# Patient Record
Sex: FEMALE | Race: WHITE | URBAN - METROPOLITAN AREA
[De-identification: names, ages, dates, MRNs, and addresses within clinical notes are randomized per-mention and may not be internally consistent; named-entity substitution may affect disease eponyms.]

---

## 2017-01-05 ENCOUNTER — OFFICE VISIT (OUTPATIENT)
Dept: FAMILY MEDICINE | Facility: CLINIC | Age: 27
End: 2017-01-05
Payer: COMMERCIAL

## 2017-01-05 VITALS
SYSTOLIC BLOOD PRESSURE: 115 MMHG | BODY MASS INDEX: 22.36 KG/M2 | WEIGHT: 131 LBS | TEMPERATURE: 98.6 F | DIASTOLIC BLOOD PRESSURE: 69 MMHG | HEIGHT: 64 IN | HEART RATE: 77 BPM

## 2017-01-05 DIAGNOSIS — K62.5 RECTAL BLEEDING: ICD-10-CM

## 2017-01-05 DIAGNOSIS — Z00.00 ANNUAL VISIT FOR GENERAL ADULT MEDICAL EXAMINATION WITHOUT ABNORMAL FINDINGS: Primary | ICD-10-CM

## 2017-01-05 PROBLEM — S83.511S RUPTURE OF ANTERIOR CRUCIATE LIGAMENT OF RIGHT KNEE, SEQUELA: Status: ACTIVE | Noted: 2017-01-05

## 2017-01-05 LAB
ERYTHROCYTE [DISTWIDTH] IN BLOOD BY AUTOMATED COUNT: 12.2 % (ref 10–15)
HCT VFR BLD AUTO: 40.4 % (ref 35–47)
HGB BLD-MCNC: 13.6 G/DL (ref 11.7–15.7)
MCH RBC QN AUTO: 29.3 PG (ref 26.5–33)
MCHC RBC AUTO-ENTMCNC: 33.7 G/DL (ref 31.5–36.5)
MCV RBC AUTO: 87 FL (ref 78–100)
PLATELET # BLD AUTO: 257 10E9/L (ref 150–450)
RBC # BLD AUTO: 4.64 10E12/L (ref 3.8–5.2)
WBC # BLD AUTO: 8.4 10E9/L (ref 4–11)

## 2017-01-05 PROCEDURE — 99385 PREV VISIT NEW AGE 18-39: CPT | Performed by: INTERNAL MEDICINE

## 2017-01-05 PROCEDURE — 85027 COMPLETE CBC AUTOMATED: CPT | Performed by: INTERNAL MEDICINE

## 2017-01-05 PROCEDURE — 36415 COLL VENOUS BLD VENIPUNCTURE: CPT | Performed by: INTERNAL MEDICINE

## 2017-01-05 PROCEDURE — 99212 OFFICE O/P EST SF 10 MIN: CPT | Mod: 25 | Performed by: INTERNAL MEDICINE

## 2017-01-05 RX ORDER — DESOGESTREL AND ETHINYL ESTRADIOL 21-5 (28)
1 KIT ORAL DAILY
COMMUNITY

## 2017-01-05 NOTE — Clinical Note
Please abstract the following data from this visit with this patient into the appropriate field in Epic:  Pap smear done on this date: 8/2016 (approximately), by this group: planned parent kristyn winters  results were normal

## 2017-01-05 NOTE — MR AVS SNAPSHOT
After Visit Summary   1/5/2017    Sarah Prater    MRN: [id number]           Patient Information     Date Of Birth          1990        Visit Information        Provider Department      1/5/2017 10:00 AM Gaviota Eli MD [place]        Today's Diagnoses     Annual visit for general adult medical examination without abnormal findings    -  1     Rectal bleeding           Care Instructions      Preventive Health Recommendations  Female Ages 18 to 25     Yearly exam:     See your health care provider every year in order to  o Review health changes.   o Discuss preventive care.    o Review your medicines if your doctor has prescribed any.      You should be tested each year for STDs (sexually transmitted diseases).       After age 20, talk to your provider about how often you should have cholesterol testing.      Starting at age 21, get a Pap test every three years. If you have an abnormal result, your doctor may have you test more often.      If you are at risk for diabetes, you should have a diabetes test (fasting glucose).     Shots:     Get a flu shot each year.     Get a tetanus shot every 10 years.     Consider getting the shot (vaccine) that prevents cervical cancer (Gardasil).    Nutrition:     Eat at least 5 servings of fruits and vegetables each day.    Eat whole-grain bread, whole-wheat pasta and brown rice instead of white grains and rice.    Talk to your provider about Calcium and Vitamin D.     Lifestyle    Exercise at least 150 minutes a week each week (30 minutes a day, 5 days a week). This will help you control your weight and prevent disease.    Limit alcohol to one drink per day.    No smoking.     Wear sunscreen to prevent skin cancer.    See your dentist every six months for an exam and cleaning.        Follow-ups after your visit        Who to contact     If you have questions or need follow up information about today's clinic visit or your schedule  "please contact [facility] [place] directly at [phone].  Normal or non-critical lab and imaging results will be communicated to you by MyChart, letter or phone within 4 business days after the clinic has received the results. If you do not hear from us within 7 days, please contact the clinic through MyChart or phone. If you have a critical or abnormal lab result, we will notify you by phone as soon as possible.  Submit refill requests through [employer] or call your pharmacy and they will forward the refill request to us. Please allow 3 business days for your refill to be completed.          Additional Information About Your Visit        [employer]verest Information     [employer] lets you send messages to your doctor, view your test results, renew your prescriptions, schedule appointments and more. To sign up, go to [email or web address] . Click on \"Log in\" on the left side of the screen, which will take you to the Welcome page. Then click on \"Sign up Now\" on the right side of the page.     You will be asked to enter the access code listed below, as well as some personal information. Please follow the directions to create your username and password.     Your access code is: [email or web address]  Expires: 2017 10:40 AM     Your access code will  in 90 days. If you need help or a new code, please call your [facility] clinic or [phone].        Care EveryWhere ID     This is your Care EveryWhere ID. This could be used by other organizations to access your [facility] medical records  [id number]        Your Vitals Were     Pulse Temperature Height BMI (Body Mass Index) Last Period       77 98.6  F (37  C) (Oral) 5' 4.17\" (1.63 m) 22.36 kg/m2 2016        Blood Pressure from Last 3 Encounters:   17 115/69    Weight from Last 3 Encounters:   17 131 lb (59.421 kg)              We Performed the Following     CBC with platelets        Primary Care Provider Office Phone # Fax #    Gaviota Eli MD " [phone]       [facility] [place] [address] [place] [address] DR  [place] [address]        Thank you!     Thank you for choosing [facility] [place]  for your care. Our goal is always to provide you with excellent care. Hearing back from our patients is one way we can continue to improve our services. Please take a few minutes to complete the written survey that you may receive in the mail after your visit with us. Thank you!             Your Updated Medication List - Protect others around you: Learn how to safely use, store and throw away your medicines at www.disposemymeds.org.          This list is accurate as of: 1/5/17 10:40 AM.  Always use your most recent med list.                   Brand Name Dispense Instructions for use    desogestrel-ethinyl estradiol 0.15-0.02/0.01 MG (21/5) per tablet    KARIVA     Take 1 tablet by mouth daily

## 2017-01-05 NOTE — NURSING NOTE
"Chief Complaint   Patient presents with     Physical     fasting        Initial /69 mmHg  Pulse 77  Temp(Src) 98.6  F (37  C) (Oral)  Ht 5' 4.17\" (1.63 m)  Wt 131 lb (59.421 kg)  BMI 22.36 kg/m2  LMP 12/27/2016 Estimated body mass index is 22.36 kg/(m^2) as calculated from the following:    Height as of this encounter: 5' 4.17\" (1.63 m).    Weight as of this encounter: 131 lb (59.421 kg).  BP completed using cuff size: vinod Espino MA     "

## 2017-01-05 NOTE — PROGRESS NOTES
SUBJECTIVE:     CC: Sarah Prater is an 26 year old woman who presents for preventive health visit.     Healthy Habits:    Do you get at least three servings of calcium containing foods daily (dairy, green leafy vegetables, etc.)? No     Amount of exercise or daily activities, outside of work: daily - dances, cycles    Problems taking medications regularly No    Medication side effects: No    Have you had an eye exam in the past two years? yes    Do you see a dentist twice per year? no    Do you have sleep apnea, excessive snoring or daytime drowsiness?no    Has noticed that for the last few months whenever it is around her menstrual cycle she is having blood in her stool. LMP ended a week ago and noticed blood in most of her bowel movements. Generally doesn't struggle with constipation. Does have some pain with bowel movements occasionally.        Today's PHQ-2 Score:   PHQ-2 ( 1999 Pfizer) 1/5/2017   Q1: Little interest or pleasure in doing things 0   Q2: Feeling down, depressed or hopeless 0   PHQ-2 Score 0       Abuse: Current or Past(Physical, Sexual or Emotional)- No  Do you feel safe in your environment - Yes    Social History   Substance Use Topics     Smoking status: Never Smoker      Smokeless tobacco: Never Used     Alcohol Use: 0.0 oz/week     0 Standard drinks or equivalent per week     The patient does not drink >3 drinks per day nor >7 drinks per week.    No results for input(s): CHOL, HDL, LDL, TRIG, CHOLHDLRATIO, NHDL in the last 51784 hours.    Reviewed orders with patient.  Reviewed health maintenance and updated orders accordingly - Yes    Mammo Decision Support:  Mammogram not appropriate for this patient based on age.    Pertinent mammograms are reviewed under the imaging tab.  History of abnormal Pap smear: NO - age 21-29 PAP every 3 years recommended  All Histories reviewed and updated in Epic.      ROS:  C: NEGATIVE for fever, chills, change in weight  I: NEGATIVE for worrisome rashes,  "moles or lesions  E: NEGATIVE for vision changes or irritation  ENT: NEGATIVE for ear, mouth and throat problems  R: NEGATIVE for significant cough or SOB  B: NEGATIVE for masses, tenderness or discharge  CV: NEGATIVE for chest pain, palpitations or peripheral edema  GI: POSITIVE for hematochezia  : NEGATIVE for unusual urinary or vaginal symptoms. Periods are regular.  M: NEGATIVE for significant arthralgias or myalgia  N: NEGATIVE for weakness, dizziness or paresthesias  P: NEGATIVE for changes in mood or affect    Problem list, Medication list, Allergies, and Medical/Social/Surgical histories reviewed in [facility] and updated as appropriate.  OBJECTIVE:     /69 mmHg  Pulse 77  Temp(Src) 98.6  F (37  C) (Oral)  Ht 5' 4.17\" (1.63 m)  Wt 131 lb (59.421 kg)  BMI 22.36 kg/m2  LMP 12/27/2016  EXAM:  GENERAL: healthy, alert and no distress  EYES: Eyes grossly normal to inspection, PERRL and conjunctivae and sclerae normal  HENT: ear canals and TM's normal, nose and mouth without ulcers or lesions  NECK: no adenopathy, no asymmetry, masses, or scars and thyroid normal to palpation  RESP: lungs clear to auscultation - no rales, rhonchi or wheezes  CV: regular rate and rhythm, normal S1 S2, no S3 or S4, no murmur, click or rub, no peripheral edema and peripheral pulses strong  ABDOMEN: soft, nontender, no hepatosplenomegaly, no masses and bowel sounds normal  MS: no gross musculoskeletal defects noted, no edema  SKIN: no suspicious lesions or rashes  NEURO: Normal strength and tone, mentation intact and speech normal  PSYCH: mentation appears normal, affect normal/bright    ASSESSMENT/PLAN:     1. Annual visit for general adult medical examination without abnormal findings  - CBC with platelets    2. Rectal bleeding  Likely hemorrhoidal. Recommending stool softeners, sitz baths PRN, fluid/fiber. If more persistent or worsening return.   - CBC with platelets    COUNSELING:   Reviewed preventive health counseling, " "as reflected in patient instructions       Regular exercise       Healthy diet/nutrition       Vision screening       Hearing screening       Contraception       Safe sex practices/STD prevention         reports that she has never smoked. She has never used smokeless tobacco.    Estimated body mass index is 22.36 kg/(m^2) as calculated from the following:    Height as of this encounter: 5' 4.17\" (1.63 m).    Weight as of this encounter: 131 lb (59.421 kg).       Counseling Resources:  ATP IV Guidelines  Pooled Cohorts Equation Calculator  Breast Cancer Risk Calculator  FRAX Risk Assessment  ICSI Preventive Guidelines  Dietary Guidelines for Americans, 2010  USDA's MyPlate  ASA Prophylaxis  Lung CA Screening    Gaviota Eli MD  [facility]  "

## 2017-08-08 ENCOUNTER — OFFICE VISIT (OUTPATIENT)
Dept: FAMILY MEDICINE | Facility: CLINIC | Age: 27
End: 2017-08-08
Payer: COMMERCIAL

## 2017-08-08 VITALS
OXYGEN SATURATION: 97 % | SYSTOLIC BLOOD PRESSURE: 124 MMHG | BODY MASS INDEX: 23.56 KG/M2 | HEIGHT: 64 IN | DIASTOLIC BLOOD PRESSURE: 76 MMHG | TEMPERATURE: 98.3 F | HEART RATE: 96 BPM | WEIGHT: 138 LBS

## 2017-08-08 DIAGNOSIS — K62.5 RECTAL BLEEDING: Primary | ICD-10-CM

## 2017-08-08 DIAGNOSIS — Z80.0 FAMILY HISTORY OF COLON CANCER: ICD-10-CM

## 2017-08-08 PROCEDURE — 99213 OFFICE O/P EST LOW 20 MIN: CPT | Performed by: FAMILY MEDICINE

## 2017-08-08 NOTE — MR AVS SNAPSHOT
After Visit Summary   8/8/2017    Sarah Prater    MRN: [id number]           Patient Information     Date Of Birth          1990        Visit Information        Provider Department      8/8/2017 1:20 PM [place]ella Giraldo [place] [facility]        Today's Diagnoses     Rectal bleeding    -  1    Family history of colon cancer          Care Instructions      Understanding Rectal Bleeding    Rectal bleeding is when blood passes through your rectum and anus. It can happen with or without a bowel movement. Rectal bleeding may be a sign of a serious problem in your rectum, colon, or upper GI tract. Call your healthcare provider right away if you have any rectal bleeding.  The GI Tract  The gastrointestinal (GI) tract includes the mouth, esophagus, stomach, small intestine, large intestine (colon), rectum, and anus. The food you eat is digested as it passes through the GI tract. Solid waste leaves the body through the rectum.   Rectal bleeding and GI problems  The cause of rectal bleeding may be found in any region of the GI tract. The colon or rectum may be the site of your bleeding problem. Or, bleeding may be due to problems farther up the GI tract, such as in the small intestine, duodenum, or stomach.  Causes of rectal bleeding  Rectal bleeding causes include the following:    Hemorrhoids (swollen veins in the rectum and anus)    Fissures (tears in or near the anus)    Diverticulosis (inflamed pockets in the colon wall)    Infection    Ischemia (low blood flow)    Radiation damage    Inflammatory bowel disease (Crohn's disease or ulcerative colitis)    Ulcers in the upper GI tract and inflammation of the large intestine    Abnormal tissue growths (tumors or polyps) in the GI tract    A bulging rectum (also called a rectal prolapse)    Abnormal blood vessels in the small intestine or in the colon  Common symptoms  Common symptoms include the following:    Rectal pain, itching, or  soreness    Belly pain or epigastric pain    Minor occasional drops of blood that appear on the stool or toilet paper, to greater amounts of stool that appear black or tarry   Rectal bleeding can also happen without pain.  Date Last Reviewed: 7/1/2016 2000-2017 [employer]. [address] [place] [address]. All rights reserved. This information is not intended as a substitute for professional medical care. Always follow your healthcare professional's instructions.                Follow-ups after your visit        Additional Services     COLORECTAL SURGERY REFERRAL       Your provider has referred you to: [place]: [facility] [place] [phone]  [email or web address]    Referral Reason(s): Consult, Rectal Bleeding and also has  fam hx of ca colon in mom at age 45   Special Concerns:   This referral is:   It is OK to leave a message on patient's voicemail.    Please be aware that coverage of these services is subject to the terms and limitations of your health insurance plan.  Call member services at your health plan with any benefit or coverage questions.      Please bring the following with you to your appointment:    (1) Any X-Rays, CTs or MRIs which have been performed.  Contact the facility where they were done to arrange for  prior to your scheduled appointment.    (2) List of current medications  (3) This referral request   (4) Any documents/labs given to you for this referral                  Who to contact     If you have questions or need follow up information about today's clinic visit or your schedule please contact [facility] [place] directly at [phone].  Normal or non-critical lab and imaging results will be communicated to you by MyChart, letter or phone within 4 business days after the clinic has received the results. If you do not hear from us within 7 days, please contact the clinic through  "[employer] or phone. If you have a critical or abnormal lab result, we will notify you by phone as soon as possible.  Submit refill requests through [employer] or call your pharmacy and they will forward the refill request to us. Please allow 3 business days for your refill to be completed.          Additional Information About Your Visit        RemCarehart Information     [employer] lets you send messages to your doctor, view your test results, renew your prescriptions, schedule appointments and more. To sign up, go to [email or web address] . Click on \"Log in\" on the left side of the screen, which will take you to the Welcome page. Then click on \"Sign up Now\" on the right side of the page.     You will be asked to enter the access code listed below, as well as some personal information. Please follow the directions to create your username and password.     Your access code is: [email or web address]  Expires: 2017  1:46 PM     Your access code will  in 90 days. If you need help or a new code, please call your [place] clinic or [phone].        Care EveryWhere ID     This is your Care EveryWhere ID. This could be used by other organizations to access your [facility] medical records  [id number]        Your Vitals Were     Pulse Temperature Height Last Period Pulse Oximetry BMI (Body Mass Index)    96 98.3  F (36.8  C) (Tympanic) 5' 4.17\" (1.63 m) 2017 97% 23.56 kg/m2       Blood Pressure from Last 3 Encounters:   17 124/76   17 115/69    Weight from Last 3 Encounters:   17 138 lb (62.6 kg)   17 131 lb (59.4 kg)              We Performed the Following     COLORECTAL SURGERY REFERRAL          Today's Medication Changes          These changes are accurate as of: 17  1:46 PM.  If you have any questions, ask your nurse or doctor.               Start taking these medicines.        Dose/Directions    hydrocortisone 2.5 % cream   Commonly known as:  ANUSOL-HC   Used for:  Rectal bleeding "   Started by:  Della Giraldo MD        Place rectally 2 times daily   Quantity:  30 g   Refills:  0            Where to get your medicines      These medications were sent to [facility] [place] - [place], [place] - [address]  [address] [place], [place] [address]     Phone:  [phone]     hydrocortisone 2.5 % cream                Primary Care Provider Office Phone # Fax #    Gaviota Eli [phone]       [facility] [address] [place] [address] [address]  [place] [address]        Equal Access to Services     [facility] Center[facility] : Hadii aad ku hadasho Soomaali, waaxda luqadaha, qaybta kaalmada adeegyada, waxay idiin hayaan mars jett . So [facility] [phone].    ATENCIÓN: Si habla español, tiene a malave disposición servicios gratuitos de asistencia lingüística. Llame al [phone].    We comply with applicable federal civil rights laws and [place] laws. We do not discriminate on the basis of race, color, national origin, age, disability sex, sexual orientation or gender identity.            Thank you!     [facility]  [facility] your [facility]. Our goal is always to provide you with excellent care. Hearing back from our patients is one way we can continue to improve our services. Please take a few minutes to complete the written survey that you may receive in the mail after your visit with us. Thank you!             Your Updated Medication List - Protect others around you: Learn how to safely use, store and throw away your medicines at www.disposemymeds.org.          This list is accurate as of: 8/8/17  1:46 PM.  Always use your most recent med list.                   Brand Name Dispense Instructions for use Diagnosis    desogestrel-ethinyl estradiol 0.15-0.02/0.01 MG (21/5) per tablet    KARIVA     Take 1 tablet by mouth daily        hydrocortisone 2.5 % cream    ANUSOL-HC    30 g    Place rectally 2 times  daily    Rectal bleeding

## 2017-08-08 NOTE — NURSING NOTE
"Chief Complaint   Patient presents with     Rectal Problem       Initial /76  Pulse 96  Temp 98.3  F (36.8  C) (Tympanic)  Ht 5' 4.17\" (1.63 m)  Wt 138 lb (62.6 kg)  LMP 07/11/2017  SpO2 97%  BMI 23.56 kg/m2 Estimated body mass index is 23.56 kg/(m^2) as calculated from the following:    Height as of this encounter: 5' 4.17\" (1.63 m).    Weight as of this encounter: 138 lb (62.6 kg).  Medication Reconciliation: complete  "

## 2017-08-08 NOTE — PATIENT INSTRUCTIONS
Understanding Rectal Bleeding    Rectal bleeding is when blood passes through your rectum and anus. It can happen with or without a bowel movement. Rectal bleeding may be a sign of a serious problem in your rectum, colon, or upper GI tract. Call your healthcare provider right away if you have any rectal bleeding.  The GI Tract  The gastrointestinal (GI) tract includes the mouth, esophagus, stomach, small intestine, large intestine (colon), rectum, and anus. The food you eat is digested as it passes through the GI tract. Solid waste leaves the body through the rectum.   Rectal bleeding and GI problems  The cause of rectal bleeding may be found in any region of the GI tract. The colon or rectum may be the site of your bleeding problem. Or, bleeding may be due to problems farther up the GI tract, such as in the small intestine, duodenum, or stomach.  Causes of rectal bleeding  Rectal bleeding causes include the following:    Hemorrhoids (swollen veins in the rectum and anus)    Fissures (tears in or near the anus)    Diverticulosis (inflamed pockets in the colon wall)    Infection    Ischemia (low blood flow)    Radiation damage    Inflammatory bowel disease (Crohn's disease or ulcerative colitis)    Ulcers in the upper GI tract and inflammation of the large intestine    Abnormal tissue growths (tumors or polyps) in the GI tract    A bulging rectum (also called a rectal prolapse)    Abnormal blood vessels in the small intestine or in the colon  Common symptoms  Common symptoms include the following:    Rectal pain, itching, or soreness    Belly pain or epigastric pain    Minor occasional drops of blood that appear on the stool or toilet paper, to greater amounts of stool that appear black or tarry   Rectal bleeding can also happen without pain.  Date Last Reviewed: 7/1/2016 2000-2017 [employer]. [address] [place] [address]. All rights reserved. This information is not intended as a  substitute for professional medical care. Always follow your healthcare professional's instructions.

## 2017-08-08 NOTE — PROGRESS NOTES
"  SUBJECTIVE:                                                    Sarah Prater is a 26 year old female who presents to clinic today for the following health issues:      Hemorrhoids/ Rectal Bleeding  Onset: x yesterday , although had similar problem couple of months ago and she was ok, but just  concerned bc of recurrent problem     Description:   Pain: YES  Itching: no     Accompanying Signs & Symptoms:  Blood streaked toilet paper: YES- first time she was having a bm when she noticed the bright red blood,.mild abdominal cramping and fatigue   Blood in stool: YES  Changes in stool pattern: YES- hard stool sometimes although goes every day     History:   Any previous GI studies done:none  Family History of colon cancer: YES- mother dx at 45 years old     Precipitating factors:   None    Alleviating factors:  None    Therapies Tried and outcome: decreased appetite           Problem list and histories reviewed & adjusted, as indicated.  Additional history: as documented    Patient Active Problem List   Diagnosis     Rupture of anterior cruciate ligament of right knee, sequela     No past surgical history on file.    Social History   Substance Use Topics     Smoking status: Never Smoker     Smokeless tobacco: Never Used     Alcohol use 0.0 oz/week     0 Standard drinks or equivalent per week     No family history on file.          Reviewed and updated as needed this visit by clinical staff     Reviewed and updated as needed this visit by Provider         ROS:  Constitutional, HEENT, cardiovascular, pulmonary, GI, , musculoskeletal, neuro, skin, endocrine and psych systems are negative, except as otherwise noted.      OBJECTIVE:   /76  Pulse 96  Temp 98.3  F (36.8  C) (Tympanic)  Ht 5' 4.17\" (1.63 m)  Wt 138 lb (62.6 kg)  LMP 07/11/2017  SpO2 97%  BMI 23.56 kg/m2  Body mass index is 23.56 kg/(m^2).  GENERAL: healthy, alert and no distress  HENT: oropharynx clear and oral mucous membranes moist  NECK: " no adenopathy  RESP: lungs clear to auscultation - no rales, rhonchi or wheezes  CV: regular rate and rhythm, normal S1 S2, no S3 or S4,   ABDOMEN: soft, nontender, no hepatosplenomegaly, no masses and bowel sounds normal  RECTAL: normal sphincter tone, no rectal masses, minimal discomfort along the anal sphincter and skin is sort of chapped   PSYCH: mentation appears normal, affect normal/bright        ASSESSMENT/PLAN:       (K62.5) Rectal bleeding  (primary encounter diagnosis)  Comment:   Plan: hydrocortisone (ANUSOL-HC) 2.5 % cream,         COLORECTAL SURGERY REFERRAL            (Z80.0) Family history of colon cancer  Comment: mom age 45   Plan: COLORECTAL SURGERY REFERRAL                Discussed possible differential diagnosis for his  Symptoms. Most likely internal hemorrhoid, causing bleeding bc of constipation. discussed high fibre / fluid diet to regulate Bm.    gave Anusol cream  and colace to help. Talked about warning s/s for which should be seen. Cares and symptomatic treatment discussed follow up if  recurrent or ongoing problem , consider further evaluation if needed.       Della Giraldo MD  [facility] [place]

## 2017-09-20 ENCOUNTER — OFFICE VISIT (OUTPATIENT)
Dept: SURGERY | Facility: CLINIC | Age: 27
End: 2017-09-20
Payer: COMMERCIAL

## 2017-09-20 VITALS
OXYGEN SATURATION: 97 % | SYSTOLIC BLOOD PRESSURE: 122 MMHG | WEIGHT: 138 LBS | HEART RATE: 80 BPM | BODY MASS INDEX: 23.56 KG/M2 | DIASTOLIC BLOOD PRESSURE: 62 MMHG | HEIGHT: 64 IN

## 2017-09-20 DIAGNOSIS — K62.5 RECTAL BLEEDING: Primary | ICD-10-CM

## 2017-09-20 PROCEDURE — 99244 OFF/OP CNSLTJ NEW/EST MOD 40: CPT | Mod: 25 | Performed by: SURGERY

## 2017-09-20 PROCEDURE — 46600 DIAGNOSTIC ANOSCOPY SPX: CPT | Performed by: SURGERY

## 2017-09-20 ASSESSMENT — ENCOUNTER SYMPTOMS
NAUSEA: 1
APPETITE CHANGE: 1
DIARRHEA: 1
CONSTIPATION: 1

## 2017-09-20 NOTE — PROGRESS NOTES
[facility]     Outpatient Consultation     Sarah Prater MRN# [id number]   YOB: 1990 Age: 26 year old     Primary care provider: Gaviota Eli    ASSESSMENT:   Sarah Prater is an 26 year old year old female who I was asked to see by Dr. Della Giraldo for rectal bleeding.    Intermittent bright red blood per rectum. Essentially painless. No obviously concerning symptoms for colorectal cancer however I note the patient's mother had rectal cancer at age 43. Mccarty syndrome unknown.    RECOMMENDATIONS:   We discussed this. The patient had minimal exam findings. There were small internal hemorrhoids which were not bleeding. Given history of bleeding once per month or so which is bright red, painless, and quickly abates, I feel that this is most likely from a benign source. However, with her mother's history and her concern, I think that endoscopy at least for peace of mind could be appropriate. I offered her flexible sigmoidoscopy versus colonoscopy. Obviously I'm unable to check her mother's records because this would be improper but I asked the patient to ask her mother about any MMR testing or whether she has been formally diagnosed with Mccarty syndrome. If she has, then I would not recommend the patient wait for colonoscopy and should definitely have one soon. Otherwise I recommend she start at age 34 and do this every 5 years for life whether it is normal or not given her mother's problem.    Shane Greenwood MD  [phone] (s)    This note was created using voice recognition software. Undetected word substitutions or other errors may have occurred.      HPI:    Sarah Prater is a 26 year old female who I was asked to see for rectal bleeding. This has been happening for several months. It is bright red. There can be some rectal discomfort but essentially is painless and she does not have any acute pain that causes her to dread moving her bowels. There has been no  "loss of weight or abdominal pain. Her stools have not changed. Usually they are soft and easy to pass but once in a while they can be larger in volume or harder and this is usually when she has the bleeding. The bleeding happens approximately once every month. Notably her mother was diagnosed with rectal cancer by history from the patient at age 43 and has just recently completed her treatment as well as her ileostomy reversal. There are no other family members with colon cancer that she knows of. However, her father's history is unknown to her.          PAST MEDICAL HISTORY:     Past Medical History:   Diagnosis Date     Knee injury     partial acl tear        PAST SURGICAL HISTORY:   History reviewed. No pertinent surgical history.    SOCIAL HISTORY:     Social History     Social History     Marital status: Single     Spouse name: N/A     Number of children: N/A     Years of education: N/A     Social History Main Topics     Smoking status: Never Smoker     Smokeless tobacco: Never Used     Alcohol use 0.0 oz/week     0 Standard drinks or equivalent per week     Drug use: No     Sexual activity: Yes     Other Topics Concern     None     Social History Narrative        FAMILY HISTORY:     Family History   Problem Relation Age of Onset     Colon Cancer Mother      at 43     Hypertension Mother      Unknown/Adopted Father        MEDICATIONS:     Current Outpatient Prescriptions   Medication Sig Dispense Refill     hydrocortisone (ANUSOL-HC) 2.5 % cream Place rectally 2 times daily 30 g 0     desogestrel-ethinyl estradiol (KARIVA) 0.15-0.02/0.01 MG (21/5) per tablet Take 1 tablet by mouth daily          ALLERGIES:   No Known Allergies     REVIEW OF SYSTEMS:   10 point ROS neg other than the symptoms noted above in the HPI or here.      PHYSICAL EXAM:   /62  Pulse 80  Ht 5' 4.17\" (1.63 m)  Wt 138 lb (62.6 kg)  SpO2 97%  BMI 23.56 kg/m2 Estimated body mass index is 23.56 kg/(m^2) as calculated from the " "following:    Height as of this encounter: 5' 4.17\" (1.63 m).    Weight as of this encounter: 138 lb (62.6 kg).   Constitutional: No acute distress  Eyes: Sclerae anicteric, moist conjunctivae; no lid-lag; PERRLA  ENT: Atraumatic; oropharynx clear with moist mucous membranes and no mucosal ulcerations; normal hard and soft palate  Neck: Supple neck without lymphadenopathy, no thyromegaly  Respiratory: Clear to auscultation bilaterally with normal respiratory effort  Cardiovascular: Regular rate and rhythm, no MRGs  GI: Soft, nontender, nondistended; no masses or HSM  Lymph/Hematologic: No pretibial edema  Genitourinary: Deferred  Skin: Normal temperature, turgor and texture; no rash, ulcers or subcutaneous nodules  Musculoskeletal: Grossly symmetric and normal muscle bulk for age in all extremities; gait and station normal; no clubbing or cyanosis  Neurologic: CN II-XII grossly intact without deficits; sensation intact to light touch over all extremities  Neuropsychiatric: Appropriate affect, alert and oriented to person, place and time  The patient is taken to the procedure room and placed in the knee-chest position. A rectal examination is performed. There are no external hemorrhoids. A digital rectal examination is performed. There is no mass. Anoscopy is performed. There are small internal hemorrhoids.     LABORATORY AND IMAGING:      N/A    Procedure (anoscopy) as documented under physical exam.       30 minutes were spent in this encounter, over 50% in counseling and coordination of care.    Please route or send letter to:  Primary Care Provider (PCP)                    "

## 2017-09-20 NOTE — LETTER
[facility]      Outpatient Consultation    September 20, 2017      Sarah Prater MRN# [id number]   YOB: 1990 Age: 26 year old      Primary care provider: Gaviota Eli     ASSESSMENT:   Sarah Prater is an 26 year old year old female who I was asked to see by Dr. Della Giraldo for rectal bleeding.     Intermittent bright red blood per rectum. Essentially painless. No obviously concerning symptoms for colorectal cancer however I note the patient's mother had rectal cancer at age 43. Mccarty syndrome unknown.     RECOMMENDATIONS:   We discussed this. The patient had minimal exam findings. There were small internal hemorrhoids which were not bleeding. Given history of bleeding once per month or so which is bright red, painless, and quickly abates, I feel that this is most likely from a benign source. However, with her mother's history and her concern, I think that endoscopy at least for peace of mind could be appropriate. I offered her flexible sigmoidoscopy versus colonoscopy. Obviously I'm unable to check her mother's records because this would be improper but I asked the patient to ask her mother about any MMR testing or whether she has been formally diagnosed with Mccarty syndrome. If she has, then I would not recommend the patient wait for colonoscopy and should definitely have one soon. Otherwise I recommend she start at age 34 and do this every 5 years for life whether it is normal or not given her mother's problem.     Shane Greenwood MD  [phone] (l)     This note was created using voice recognition software. Undetected word substitutions or other errors may have occurred.        HPI:    Sarah Prater is a 26 year old female who I was asked to see for rectal bleeding. This has been happening for several months. It is bright red. There can be some rectal discomfort but essentially is painless and she does not have any acute pain that causes her to dread moving  "her bowels. There has been no loss of weight or abdominal pain. Her stools have not changed. Usually they are soft and easy to pass but once in a while they can be larger in volume or harder and this is usually when she has the bleeding. The bleeding happens approximately once every month. Notably her mother was diagnosed with rectal cancer by history from the patient at age 43 and has just recently completed her treatment as well as her ileostomy reversal. There are no other family members with colon cancer that she knows of. However, her father's history is unknown to her.        ALLERGIES:   No Known Allergies      REVIEW OF SYSTEMS:   10 point ROS neg other than the symptoms noted above in the HPI or here.       PHYSICAL EXAM:   /62  Pulse 80  Ht 5' 4.17\" (1.63 m)  Wt 138 lb (62.6 kg)  SpO2 97%  BMI 23.56 kg/m2 Estimated body mass index is 23.56 kg/(m^2) as calculated from the following:    Height as of this encounter: 5' 4.17\" (1.63 m).    Weight as of this encounter: 138 lb (62.6 kg).   Constitutional: No acute distress  Eyes: Sclerae anicteric, moist conjunctivae; no lid-lag; PERRLA  ENT: Atraumatic; oropharynx clear with moist mucous membranes and no mucosal ulcerations; normal hard and soft palate  Neck: Supple neck without lymphadenopathy, no thyromegaly  Respiratory: Clear to auscultation bilaterally with normal respiratory effort  Cardiovascular: Regular rate and rhythm, no MRGs  GI: Soft, nontender, nondistended; no masses or HSM  Lymph/Hematologic: No pretibial edema  Genitourinary: Deferred  Skin: Normal temperature, turgor and texture; no rash, ulcers or subcutaneous nodules  Musculoskeletal: Grossly symmetric and normal muscle bulk for age in all extremities; gait and station normal; no clubbing or cyanosis  Neurologic: CN II-XII grossly intact without deficits; sensation intact to light touch over all extremities  Neuropsychiatric: Appropriate affect, alert and oriented to person, place " and time  The patient is taken to the procedure room and placed in the knee-chest position. A rectal examination is performed. There are no external hemorrhoids. A digital rectal examination is performed. There is no mass. Anoscopy is performed. There are small internal hemorrhoids.      Shane Greenwood MD

## 2017-09-20 NOTE — MR AVS SNAPSHOT
"              After Visit Summary   2017    Sarah Prater    MRN: [id number]           Patient Information     Date Of Birth          1990        Visit Information        Provider Department      2017 9:15 AM Shane Greenwood MD [facility] General Surgery      Today's Diagnoses     Rectal bleeding    -  1       Follow-ups after your visit        Who to contact     If you have questions or need follow up information about today's clinic visit or your schedule please contact [facility] directly at [phone].  Normal or non-critical lab and imaging results will be communicated to you by [employer], letter or phone within 4 business days after the clinic has received the results. If you do not hear from us within 7 days, please contact the clinic through [employer] or phone. If you have a critical or abnormal lab result, we will notify you by phone as soon as possible.  Submit refill requests through [employer] or call your pharmacy and they will forward the refill request to us. Please allow 3 business days for your refill to be completed.          Additional Information About Your Visit        MyChart Information     [employer] lets you send messages to your doctor, view your test results, renew your prescriptions, schedule appointments and more. To sign up, go to [email or web address] . Click on \"Log in\" on the left side of the screen, which will take you to the Welcome page. Then click on \"Sign up Now\" on the right side of the page.     You will be asked to enter the access code listed below, as well as some personal information. Please follow the directions to create your username and password.     Your access code is: [email or web address]  Expires: 2017  1:46 PM     Your access code will  in 90 days. If you need help or a new code, please call your [facility] clinic or [phone].        Care EveryWhere ID     This is your " "Care EveryWhere ID. This could be used by other organizations to access your [facility] medical records  [id number]        Your Vitals Were     Pulse Height Pulse Oximetry BMI (Body Mass Index)          80 5' 4.17\" (1.63 m) 97% 23.56 kg/m2         Blood Pressure from Last 3 Encounters:   09/20/17 122/62   08/08/17 124/76   01/05/17 115/69    Weight from Last 3 Encounters:   09/20/17 138 lb (62.6 kg)   08/08/17 138 lb (62.6 kg)   01/05/17 131 lb (59.4 kg)              Today, you had the following     No orders found for display       Primary Care Provider Office Phone # Fax #    Gaviota Eli [phone]        [address]  [place] [address]        Equal Access to Services     [facility]: Hadii aad ku hadasho Soomaali, waaxda luqadaha, qaybta kaalmada adeegyada, waxay dieudonne haybandar jett . So [facility] [phone].    ATENCIÓN: Si habla español, tiene a malave disposición servicios gratuitos de asistencia lingüística. Llame al [phone].    We comply with applicable federal civil rights laws and [place] laws. We do not discriminate on the basis of race, color, national origin, age, disability sex, sexual orientation or gender identity.            Thank you!     Thank you for choosing [facility]  for your care. Our goal is always to provide you with excellent care. Hearing back from our patients is one way we can continue to improve our services. Please take a few minutes to complete the written survey that you may receive in the mail after your visit with us. Thank you!             Your Updated Medication List - Protect others around you: Learn how to safely use, store and throw away your medicines at www.disposemymeds.org.          This list is accurate as of: 9/20/17 11:52 AM.  Always use your most recent med list.                   Brand Name Dispense Instructions for use Diagnosis    desogestrel-ethinyl estradiol 0.15-0.02/0.01 MG (21/5) per " tablet    KARIVA     Take 1 tablet by mouth daily        hydrocortisone 2.5 % cream    ANUSOL-HC    30 g    Place rectally 2 times daily    Rectal bleeding

## 2017-09-20 NOTE — PROGRESS NOTES
HPI      ROS (Review of Systems):      Positive for appetite change, back injury and bloody stools .   GASTROINTESTINAL: Positive for nausea, diarrhea and constipation.          Physical Exam

## 2019-01-28 NOTE — PATIENT INSTRUCTIONS
Preventive Health Recommendations  Female Ages 18 to 25     Yearly exam:     See your health care provider every year in order to  o Review health changes.   o Discuss preventive care.    o Review your medicines if your doctor has prescribed any.      You should be tested each year for STDs (sexually transmitted diseases).       After age 20, talk to your provider about how often you should have cholesterol testing.      Starting at age 21, get a Pap test every three years. If you have an abnormal result, your doctor may have you test more often.      If you are at risk for diabetes, you should have a diabetes test (fasting glucose).     Shots:     Get a flu shot each year.     Get a tetanus shot every 10 years.     Consider getting the shot (vaccine) that prevents cervical cancer (Gardasil).    Nutrition:     Eat at least 5 servings of fruits and vegetables each day.    Eat whole-grain bread, whole-wheat pasta and brown rice instead of white grains and rice.    Talk to your provider about Calcium and Vitamin D.     Lifestyle    Exercise at least 150 minutes a week each week (30 minutes a day, 5 days a week). This will help you control your weight and prevent disease.    Limit alcohol to one drink per day.    No smoking.     Wear sunscreen to prevent skin cancer.    See your dentist every six months for an exam and cleaning.  
no